# Patient Record
Sex: FEMALE | Race: BLACK OR AFRICAN AMERICAN | Employment: OTHER | URBAN - METROPOLITAN AREA
[De-identification: names, ages, dates, MRNs, and addresses within clinical notes are randomized per-mention and may not be internally consistent; named-entity substitution may affect disease eponyms.]

---

## 2019-04-25 ENCOUNTER — DOCUMENTATION ONLY (OUTPATIENT)
Dept: FAMILY MEDICINE | Facility: CLINIC | Age: 74
End: 2019-04-25

## 2019-04-25 ENCOUNTER — THERAPY VISIT (OUTPATIENT)
Dept: PHYSICAL THERAPY | Facility: CLINIC | Age: 74
End: 2019-04-25
Payer: COMMERCIAL

## 2019-04-25 DIAGNOSIS — M54.16 LUMBAR RADICULITIS: ICD-10-CM

## 2019-04-25 PROCEDURE — 97112 NEUROMUSCULAR REEDUCATION: CPT | Mod: GP | Performed by: PHYSICAL THERAPIST

## 2019-04-25 PROCEDURE — 97161 PT EVAL LOW COMPLEX 20 MIN: CPT | Mod: GP | Performed by: PHYSICAL THERAPIST

## 2019-04-25 PROCEDURE — 97110 THERAPEUTIC EXERCISES: CPT | Mod: GP | Performed by: PHYSICAL THERAPIST

## 2019-04-25 NOTE — LETTER
[facility]  [address]  [place] [address]  [phone]    2019    Re: Deisy Lara   :   1945  MRN:  [id number]   REFERRING PHYSICIAN:   Fer Coulter    [facility]    Date of Initial Evaluation: 2019  Visits:  Rxs Used: 1  Reason for Referral:  Lumbar radiculitis    EVALUATION SUMMARY    Subjective:  Deisy Lara is a 73 year old female with a lumbar condition. This is a chronic condition  19 saw MD for chronic LBP.  2010 insidious onset LBP that is worsening over time.  Has continued to have pain, even after 3 low back surgeries (including fusions, but doesn't know what levels or what other surgery had).  L4-L5 fusion from MD order.  Most recent fusion 2 years ago.    Patient reports pain:  Lumbar spine right and lumbar spine left (R>L).  Radiates to:  Gluteals left, gluteals right, lower leg right and knee right (anterior R thigh ).  Pain is described as aching (sore, hurting) and is intermittent and reported as 8/10.  Associated symptoms:  Tingling, loss of motion/stiffness, loss of balance and loss of strength (intermittent tingling R lateral hip, 3 falls 2018 while walking, giving out of R leg). Pain is worse in the A.M..  Symptoms are exacerbated by walking, standing and bending (walking 2-3 min then gets pain, getting up from sitting ) and relieved by rest (sitting).  Since onset symptoms are gradually worsening.    Previous treatment includes other.    General health as reported by patient is good.  Pertinent medical history includes:  Anemia, asthma, diabetes, high blood pressure and osteoarthritis (was using SEC for past 2 years but lost it last week.  Patient notes will sit for hours sewing without breaks and ankles swell).  Medical allergies: yes (amoxicilin, juneva).  Other surgeries include:  Orthopedic surgery  (back x 3).  Current medications:  High blood pressure medication.  Current occupation is Retired teacher.      Barriers include:  Other (stairs to basement).  Red flags:  Pain at night/rest.                 Objective:  Gait:  Ambulates with slow zoe and increased weight shifting side to side (suspect gluteal weakness)  Weight Bearing Status:  WBAT   Assistive Devices:  None    Lumbar/SI Evaluation  ROM:    AROM Lumbar:   Flexion:          No loss, reaches to toes with mild pain in LB  Ext:                    Max loss due to balance, back against plinth mod loss   Re: Deisy Lara   :   1945    Side Bend:        Left:     Right:   Rotation:           Left:     Right:   Side Glide:        Left:  Min loss painful    Right:  Min loss painful      Strength: MMT hip ABD B 4/5, hip ext 3+/5 B  Lumbar Myotomes:    T12-L3 (Hip Flex):  Left: 4+    Right: 4+  L2-4 (Quads):  Left:  5    Right:  4  L4 (Ankle DF):  Left:  5    Right:  5  L5 (Great Toe Ext): Left: 5    Right: 5   S1 (Toe Raise):  Right: 5  Lumbar Dermtomes:  normal  Neural Tension/Mobility:    Left side:SLR; SLR w/DF or Slump  negative.   Right side:   SLR w/DF positive.  Right side:   Slump or SLR  negative.   Lumbar Palpation:    Tenderness present at Left:    Piriformis and Gluteus Medius  Tenderness not present at Left:    Quadratus Lumborum; Erector Spinae; PSIS or Vertebral  Tenderness present at Right: Piriformis and Gluteus Medius  Tenderness not present at Right:  Quadratus Lumborum; Erector Spinae; PSIS or Vertebral    Bentley Lumbar Evaluation  Posture:  Sitting: poor  Correction of Posture: no effect  Other Observations: No pain in sitting, lumbar support (flat towel) feels good  Test Movements:  FIS: Pretest Movements: 5/10 LB and lateral R hip pain  EIS: During: no effect  After: no effect    Repeat EIS: During: decreases  After: better  Mechanical Response: IncROM  Static Tests:  Sitting Slouched: increases pain  Sitting Erect:  lessens pain  Conclusion: derangement (potential derangement asymmetrical below knee)  Principle of Treatment:  Posture Correction: Educate keep neutral spine, use of lumbar support (flat towel if more comfortable than roll), slouch over-correct, over-correct and back off 10% to nuetral unsupported sitting, frequent change of position, sit only for 60 min with sewing.  Discuss Sit <->sidelying<-> supine to lessen strain to LB.  Extension: trial EIS back leaning against counter for balance x 10 every 2-3 hours.  Prone lying prn for relief.  Other: core stabilization, balance work     Assessment/Plan:    Patient is a 73 year old female with lumbar complaints.    Patient has the following significant findings with corresponding treatment plan.                Diagnosis 1:  Lumbar radiculitis  Pain -  manual therapy, self management, education and home program  Decreased ROM/flexibility - manual therapy, therapeutic exercise and home program  Re: Deisy Fabian Jane   :   1945    Decreased strength - therapeutic exercise, therapeutic activities and home program  Impaired balance - neuro re-education, gait training, therapeutic activities, adaptive equipment/assistive device and home program  Decreased proprioception - neuro re-education, gait training, therapeutic activities and home program  Decreased function - therapeutic activities and home program  Impaired posture - neuro re-education and home program    Therapy Evaluation Codes:      Cumulative Therapy Evaluation is: Low complexity.    Previous and current functional limitations:  (See Goal Flow Sheet for this information)    Short term and Long term goals: (See Goal Flow Sheet for this information)     Communication ability:  Patient appears to be able to clearly communicate and understand verbal and written communication and follow directions correctly.  Treatment Explanation - The following has been discussed with the patient:   RX ordered/plan of  care  Anticipated outcomes  Possible risks and side effects  This patient would benefit from PT intervention to resume normal activities.   Rehab potential is good.    Frequency:  1 X week, once daily  Duration:  for 6 weeks  Discharge Plan:  Achieve all LTG.  Independent in home treatment program.  Reach maximal therapeutic benefit.    Thank you for your referral.    INQUIRIES  Therapist: Alicia Molina DPT  [facility] PHYSICAL THERAPY  [address] [address]  Phone: [phone]  Fax: [phone]

## 2019-04-25 NOTE — PROGRESS NOTES
[facility] Initial Evaluation  Subjective:    Deisy Lara is a 73 year old female with a lumbar condition.      This is a chronic condition  4/18/19 saw MD for chronic LBP.  2010 insidious onset LBP that is worsening over time.  Has continued to have pain, even after 3 low back surgeries (including fusions, but doesn't know what levels or what other surgery had).  L4-L5 fusion from MD order.  Most recent fusion 2 years ago.  .    Patient reports pain:  Lumbar spine right and lumbar spine left (R>L).  Radiates to:  Gluteals left, gluteals right, lower leg right and knee right (anterior R thigh ).  Pain is described as aching (sore, hurting) and is intermittent and reported as 8/10.  Associated symptoms:  Tingling, loss of motion/stiffness, loss of balance and loss of strength (intermittent tingling R lateral hip, 3 falls 12/2018 while walking, giving out of R leg). Pain is worse in the A.M..  Symptoms are exacerbated by walking, standing and bending (walking 2-3 min then gets pain, getting up from sitting ) and relieved by rest (sitting).  Since onset symptoms are gradually worsening.    Previous treatment includes other.    General health as reported by patient is good.  Pertinent medical history includes:  Anemia, asthma, diabetes, high blood pressure and osteoarthritis (was using SEC for past 2 years but lost it last week.  Patient notes will sit for hours sewing without breaks and ankles swell).  Medical allergies: yes (amoxicilin, juneva).  Other surgeries include:  Orthopedic surgery (back x 3).  Current medications:  High blood pressure medication.  Current occupation is Retired teacher  .        Barriers include:  Other (stairs to basement).    Red flags:  Pain at night/rest.                        Objective:    Gait:  Ambulates with slow zoe and increased weight shifting side to side (suspect gluteal weakness)  Weight Bearing Status:  WBAT   Assistive Devices:   None                 Lumbar/SI Evaluation  ROM:    AROM Lumbar:   Flexion:          No loss, reaches to toes with mild pain in LB  Ext:                    Max loss due to balance, back against plinth mod loss   Side Bend:        Left:     Right:   Rotation:           Left:     Right:   Side Glide:        Left:  Min loss painful    Right:  Min loss painful        Strength: MMT hip ABD B 4/5, hip ext 3+/5 B  Lumbar Myotomes:    T12-L3 (Hip Flex):  Left: 4+    Right: 4+  L2-4 (Quads):  Left:  5    Right:  4  L4 (Ankle DF):  Left:  5    Right:  5  L5 (Great Toe Ext): Left: 5    Right: 5   S1 (Toe Raise):  Right: 5      Lumbar Dermtomes:  normal                Neural Tension/Mobility:      Left side:SLR; SLR w/DF or Slump  negative.   Right side:   SLR w/DF positive.  Right side:   Slump or SLR  negative.   Lumbar Palpation:    Tenderness present at Left:    Piriformis and Gluteus Medius  Tenderness not present at Left:    Quadratus Lumborum; Erector Spinae; PSIS or Vertebral  Tenderness present at Right: Piriformis and Gluteus Medius  Tenderness not present at Right:  Quadratus Lumborum; Erector Spinae; PSIS or Vertebral                                                       Bentley Lumbar Evaluation    Posture:  Sitting: poor        Correction of Posture: no effect  Other Observations: No pain in sitting, lumbar support (flat towel) feels good    Test Movements:  FIS: Pretest Movements: 5/10 LB and lateral R hip pain    EIS: During: no effect  After: no effect    Repeat EIS: During: decreases  After: better  Mechanical Response: IncROM          Static Tests:  Sitting Slouched: increases pain  Sitting Erect: lessens pain          Conclusion: derangement (potential derangement asymmetrical below knee)  Principle of Treatment:  Posture Correction: Educate keep neutral spine, use of lumbar support (flat towel if more comfortable than roll), slouch over-correct, over-correct and back off 10% to nuetral unsupported sitting,  frequent change of position, sit only for 60 min with sewing.  Discuss Sit <->sidelying<-> supine to lessen strain to LB.    Extension: trial EIS back leaning against counter for balance x 10 every 2-3 hours.  Prone lying prn for relief.    Other: core stabilization, balance work                                       ROS    Assessment/Plan:    Patient is a 73 year old female with lumbar complaints.    Patient has the following significant findings with corresponding treatment plan.                Diagnosis 1:  Lumbar radiculitis    Pain -  manual therapy, self management, education and home program  Decreased ROM/flexibility - manual therapy, therapeutic exercise and home program  Decreased strength - therapeutic exercise, therapeutic activities and home program  Impaired balance - neuro re-education, gait training, therapeutic activities, adaptive equipment/assistive device and home program  Decreased proprioception - neuro re-education, gait training, therapeutic activities and home program  Decreased function - therapeutic activities and home program  Impaired posture - neuro re-education and home program    Therapy Evaluation Codes:      Cumulative Therapy Evaluation is: Low complexity.    Previous and current functional limitations:  (See Goal Flow Sheet for this information)    Short term and Long term goals: (See Goal Flow Sheet for this information)     Communication ability:  Patient appears to be able to clearly communicate and understand verbal and written communication and follow directions correctly.  Treatment Explanation - The following has been discussed with the patient:   RX ordered/plan of care  Anticipated outcomes  Possible risks and side effects  This patient would benefit from PT intervention to resume normal activities.   Rehab potential is good.    Frequency:  1 X week, once daily  Duration:  for 6 weeks  Discharge Plan:  Achieve all LTG.  Independent in home treatment program.  Reach  maximal therapeutic benefit.    Please refer to the daily flowsheet for treatment today, total treatment time and time spent performing 1:1 timed codes.

## 2019-04-25 NOTE — PROGRESS NOTES
"When opening a documentation only encounter, be sure to enter in \"Chief Complaint\" Forms and in \" Comments\" Title of form, description if needed.    Deisy is a 73 year old  female  Form received via: Fax  Form now resides in: Provider Ready    Katie Skinner CMA                Form has been completed by provider.     Form sent out via: Fax to [phone]  Patient informed: No, Reason:fax confirmed  NOT DRChloe LINK\"S PATIENT   Output date: April 25, 2019    Katie Skinner CMA      **Please close the encounter**      "

## 2019-05-20 ENCOUNTER — THERAPY VISIT (OUTPATIENT)
Dept: PHYSICAL THERAPY | Facility: CLINIC | Age: 74
End: 2019-05-20
Payer: COMMERCIAL

## 2019-05-20 DIAGNOSIS — M54.16 LUMBAR RADICULITIS: ICD-10-CM

## 2019-05-20 PROCEDURE — 97110 THERAPEUTIC EXERCISES: CPT | Mod: GP | Performed by: PHYSICAL THERAPY ASSISTANT

## 2019-05-20 PROCEDURE — 97112 NEUROMUSCULAR REEDUCATION: CPT | Mod: GP | Performed by: PHYSICAL THERAPY ASSISTANT

## 2019-05-23 ENCOUNTER — THERAPY VISIT (OUTPATIENT)
Dept: PHYSICAL THERAPY | Facility: CLINIC | Age: 74
End: 2019-05-23
Payer: COMMERCIAL

## 2019-05-23 DIAGNOSIS — M54.16 LUMBAR RADICULITIS: ICD-10-CM

## 2019-05-23 PROCEDURE — 97112 NEUROMUSCULAR REEDUCATION: CPT | Mod: GP | Performed by: PHYSICAL THERAPIST

## 2019-05-23 PROCEDURE — 97110 THERAPEUTIC EXERCISES: CPT | Mod: GP | Performed by: PHYSICAL THERAPIST

## 2019-05-28 ENCOUNTER — THERAPY VISIT (OUTPATIENT)
Dept: PHYSICAL THERAPY | Facility: CLINIC | Age: 74
End: 2019-05-28
Payer: COMMERCIAL

## 2019-05-28 DIAGNOSIS — M54.16 LUMBAR RADICULITIS: ICD-10-CM

## 2019-05-28 PROCEDURE — 97110 THERAPEUTIC EXERCISES: CPT | Mod: GP | Performed by: PHYSICAL THERAPIST

## 2019-05-28 PROCEDURE — 97140 MANUAL THERAPY 1/> REGIONS: CPT | Mod: GP | Performed by: PHYSICAL THERAPIST

## 2019-05-28 PROCEDURE — 97112 NEUROMUSCULAR REEDUCATION: CPT | Mod: GP | Performed by: PHYSICAL THERAPIST

## 2019-05-30 ENCOUNTER — THERAPY VISIT (OUTPATIENT)
Dept: PHYSICAL THERAPY | Facility: CLINIC | Age: 74
End: 2019-05-30
Payer: COMMERCIAL

## 2019-05-30 DIAGNOSIS — M54.16 LUMBAR RADICULITIS: ICD-10-CM

## 2019-05-30 PROCEDURE — 97110 THERAPEUTIC EXERCISES: CPT | Mod: GP | Performed by: PHYSICAL THERAPIST

## 2019-05-30 PROCEDURE — 97112 NEUROMUSCULAR REEDUCATION: CPT | Mod: GP | Performed by: PHYSICAL THERAPIST

## 2019-06-06 ENCOUNTER — THERAPY VISIT (OUTPATIENT)
Dept: PHYSICAL THERAPY | Facility: CLINIC | Age: 74
End: 2019-06-06
Payer: COMMERCIAL

## 2019-06-06 DIAGNOSIS — M54.16 LUMBAR RADICULITIS: ICD-10-CM

## 2019-06-06 PROCEDURE — 97112 NEUROMUSCULAR REEDUCATION: CPT | Mod: GP | Performed by: PHYSICAL THERAPIST

## 2019-06-06 PROCEDURE — 97110 THERAPEUTIC EXERCISES: CPT | Mod: GP | Performed by: PHYSICAL THERAPIST

## 2019-06-06 NOTE — LETTER
[facility] PHYSICAL THERAPY  [address]  [place] [address]  [phone]    2019    Re: Deisy Lara   :   1945  MRN:  [id number]   REFERRING PHYSICIAN:   Fer Coulter    [facility]    Date of Initial Evaluation:  2019  Visits:  Rxs Used: 6  Reason for Referral:  Lumbar radiculitis    PROGRESS  REPORT  Progress reporting period is from 19 to 19, 6 visits.       SUBJECTIVE  Subjective changes noted by patient: The last couple days has not had too much pain at all and could stand and walk upright, but awoke today with increased low back pain for unknown reasons.  Reports always leaves therapy feeling really good but when goes home and sits on couch for a long time then big increase in pain.  Saw MD last week and told either surgery or injection.  Saw surgeon yesterday and told can get injection in spine.  Patient would like to continue therapy while awaiting the injection.  Has been doing a lot at home, moving furniture for carpet shampooing etc.  Pain today R>L LB and lateral hip to thigh.     Current Pain level: 5/10.     Initial Pain level: 8/10.   Changes in function: Yes (See Goal flowsheet attached for changes in current functional level)  Adverse reaction to treatment or activity: None    OBJECTIVE  Changes noted in objective findings: Yes:  Continued poor slumped posture in clinic with hips slid forward in chair in flexed posture.   Suspect poor habitual C-T-L spine posture prevents patient from improving.  Consistently patient responds very well in clinic to lumbar extension principles, EIS and EIL with centralization of leg symptoms to low back and abolishment of symptoms by end of therapy session.  However, patient consistently returns to clinic with significant pain.    LROM flexion no loss, reaches to toes, ext mod loss.  EIS and EIL  decrease, better abolish symptoms    SLS 1-2 seconds B with hip drop.    Oswestry Low Back Disability Index improved from 33% to 30%.             Re: Deisy Lara   :   1945    ASSESSMENT/PLAN  Updated problem list and treatment plan:   Diagnosis 1: LBP  Pain - manual therapy, self management, education, directional preference exercise and home program  Decreased ROM/flexibility - manual therapy, therapeutic exercise and home program  Decreased strength - therapeutic exercise, therapeutic activities and home program  Decreased proprioception - neuro re-education, therapeutic activities and home program  Impaired gait - gait training, assistive devices and home program  Decreased function - therapeutic activities and home program  Impaired posture - neuro re-education and home program  STG/LTGs have been met or progress has been made towards goals: Yes (See Goal flow sheet completed today.)  Assessment of Progress: Patient responds very well in therapy, but unable to maintain improvements (always returns to therapy in significant pain).  Suspect lumbar steroid injection may be helpful.  Despite h/o lumbar fusion, patient responds very well to lumbar extension principles with consistent abolishment of symptoms in clinic.  Self Management Plans: Patient has been instructed in a home treatment program.  Patient has been instructed in self management of symptoms.  I have re-evaluated this patient and find that the nature, scope, duration and intensity of the therapy is appropriate for the medical condition of the patient.  Deisy continues to require the following intervention to meet STG and LTG's: PT    Recommendations:  This patient would benefit from continued therapy.     Frequency: 1 X week, once daily  Duration: for 6 weeks    Patient to follow up with MD for potential LIZABETH.        Thank you for your referral.    INQUIRIES  Therapist: Alicia Molina DPT   [place]  PHYSICAL THERAPY  [address] [address]  Phone: [phone]  Fax: [phone]

## 2019-06-06 NOTE — PROGRESS NOTES
Subjective:  HPI  Oswestry Score: 30 %                 Objective:  System    Physical Exam    General     ROS    Assessment/Plan:    PROGRESS  REPORT    Progress reporting period is from 4/25/19 to 6/6/19, 6 visits.       SUBJECTIVE  Subjective changes noted by patient:   The last couple days has not had too much pain at all and could stand and walk upright, but awoke today with increased low back pain for unknown reasons.  Reports always leaves therapy feeling really good but when goes home and sits on couch for a long time then big increase in pain.  Saw MD last week and told either surgery or injection.  Saw surgeon yesterday and told can get injection in spine.  Patient would like to continue therapy while awaiting the injection.  Has been doing a lot at home, moving furniture for carpet shampooing etc.  Pain today R>L LB and lateral hip to thigh     Current Pain level: 5/10.     Initial Pain level: 8/10.   Changes in function:  Yes (See Goal flowsheet attached for changes in current functional level)  Adverse reaction to treatment or activity: None    OBJECTIVE  Changes noted in objective findings:  Yes:   Continued poor slumped posture in clinic with hips slid forward in chair in flexed posture.    Suspect poor habitual C-T-L spine posture prevents patient from improving.  Consistently patient responds very well in clinic to lumbar extension principles, EIS and EIL with centralization of leg symptoms to low back and abolishment of symptoms by end of therapy session.  However, patient consistently returns to clinic with significant pain.      LROM flexion no loss, reaches to toes, ext mod loss.  EIS and EIL decrease, better abolish symptoms    SLS 1-2 seconds B with hip drop.    Oswestry Low Back Disability Index improved from 33% to 30%.     ASSESSMENT/PLAN  Updated problem list and treatment plan: Diagnosis 1:  LBP    Pain -  manual therapy, self management, education, directional preference exercise and home  program  Decreased ROM/flexibility - manual therapy, therapeutic exercise and home program  Decreased strength - therapeutic exercise, therapeutic activities and home program  Decreased proprioception - neuro re-education, therapeutic activities and home program  Impaired gait - gait training, assistive devices and home program  Decreased function - therapeutic activities and home program  Impaired posture - neuro re-education and home program  STG/LTGs have been met or progress has been made towards goals:  Yes (See Goal flow sheet completed today.)  Assessment of Progress: Patient responds very well in therapy, but unable to maintain improvements (always returns to therapy in significant pain).  Suspect lumbar steroid injection may be helpful.  Despite h/o lumbar fusion, patient responds very well to lumbar extension principles with consistent abolishment of symptoms in clinic.  Self Management Plans:  Patient has been instructed in a home treatment program.  Patient  has been instructed in self management of symptoms.  I have re-evaluated this patient and find that the nature, scope, duration and intensity of the therapy is appropriate for the medical condition of the patient.  Deisy continues to require the following intervention to meet STG and LTG's:  PT    Recommendations:  This patient would benefit from continued therapy.     Frequency:  1 X week, once daily  Duration:  for 6 weeks      Patient to follow up with MD for potential LIZABETH.    Please refer to the daily flowsheet for treatment today, total treatment time and time spent performing 1:1 timed codes.

## 2019-07-01 PROBLEM — M54.16 LUMBAR RADICULITIS: Status: RESOLVED | Noted: 2019-04-25 | Resolved: 2019-07-01

## 2019-07-01 NOTE — PROGRESS NOTES
Patient did not return for further treatment and no additional progress was noted.  Please refer to the progress note and goal flowsheet completed on 06/06/19 for discharge information.

## 2020-06-30 ENCOUNTER — THERAPY VISIT (OUTPATIENT)
Dept: PHYSICAL THERAPY | Facility: CLINIC | Age: 75
End: 2020-06-30
Payer: COMMERCIAL

## 2020-06-30 DIAGNOSIS — M48.02 CERVICAL STENOSIS OF SPINAL CANAL: Primary | ICD-10-CM

## 2020-06-30 PROCEDURE — 97530 THERAPEUTIC ACTIVITIES: CPT | Mod: GP | Performed by: PHYSICAL THERAPIST

## 2020-06-30 PROCEDURE — 97535 SELF CARE MNGMENT TRAINING: CPT | Mod: GP | Performed by: PHYSICAL THERAPIST

## 2020-06-30 PROCEDURE — 97162 PT EVAL MOD COMPLEX 30 MIN: CPT | Mod: GP | Performed by: PHYSICAL THERAPIST

## 2020-06-30 NOTE — LETTER
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 It appears that she does most of the chores at her home. She suffered a CVA in 2019. Was hospitalized for only two days. When having the stroke, she could not stand on her right leg. She now reports a sudden onset of 5/10 right arm pain with decreased right  strength. Diagnosed with cervical stenosis. We discussed her anatomy, diagnosis, and how to fall proof her home. She is a very motivated person, willing to start a HEP.  The history is provided by the patient. No  was used.   Therapist Generated HPI Evaluation  Affected Side: doesn't complain of neck pain.  This is a new condition.  Condition occurred with:  Insidious onset.  Site of Pain: only in the right arm.  Pain radiates to:  Lower arm right and hand right.   Associated symptoms:  Loss of strength and loss of motion/stiffness. Symptoms are exacerbated by lifting  and relieved by nothing.    Patient Health History  Deisy Lara being seen for right arm and hand pain.   Pain is reported as 5/10 on pain scale.  Pertinent medical history includes: asthma, diabetes and numbness/tingling. Other medical history details: CVA in .   Red flags:  None as reported by patient.  Medical allergies: none.   Current medications:  High blood pressure medication.    Current occupation is retired teacher.   Primary job tasks include:  Repetitive tasks.          Objective:  Standing  Alignment:    Cervical/Thoracic:  Forward head  Shoulder/UE:  Rounded shoulders      Re: Deisy Lara   :   1945    Cervical/Thoracic Evaluation  AROM:  AROM Cervical:  Flexion:          WNL  Extension:       20%  Rotation:         Left: 50%     Right: 60%  Side Bend:      Left:     Right:   Headaches cervical eval: intermittent.  Cervical Myotomes:  Cervical myotomes: appeared normal - mild decrease in  strength on the right.  Cervical Palpation:  : denies neck pain.                                     Musculoskeletal:        Arms:            Assessment/Plan:    Patient is a 74 year old female with cervical complaints.    Patient has the following significant findings with corresponding treatment plan.                Diagnosis 1:  Cervical stenosis  Pain -  self management, education and home program  Decreased strength - therapeutic exercise, therapeutic activities and home program  Impaired muscle performance - neuro re-education and home program  Decreased function - therapeutic activities and home program                        Re: Deisy Lara   :   1945    Therapy Evaluation Codes:   1) History comprised of:   Personal factors that impact the plan of care:      None.    Comorbidity factors that impact the plan of care are:      High blood pressure and Stroke.     Medications impacting care: High blood pressure.  2) Examination of Body Systems comprised of:   Body structures and functions that impact the plan of care:      Cervical spine.   Activity limitations that impact the plan of care are:      Grasping and Lifting.  3) Clinical presentation characteristics are:   Evolving/Changing.  4) Decision-Making    Moderate complexity using standardized patient assessment instrument   and/or measureable assessment of functional outcome.  Cumulative Therapy Evaluation is: Moderate complexity.    Previous and current functional limitations:  (See Goal Flow Sheet for this information)     Short term and Long term goals: (See Goal Flow Sheet for this information)   Communication ability:  Patient appears to be able to clearly communicate and understand verbal and written communication and follow directions correctly.  Treatment Explanation - The following has been discussed with the patient:   RX ordered/plan of care, Anticipated outcomes. Possible risks and side effects    This patient would benefit from PT intervention to resume normal activities.   Rehab potential is good.  Frequency:  1 X week, once daily  Duration:  for 8 weeks  Discharge Plan:  Achieve all LTG.  Independent in home treatment program.  Reach maximal therapeutic benefit.    Thank you for your referral.    INQUIRIES        Therapist:  Jaclyn Lincoln PT   [facility] [place] PHYSICAL THERAPY  [address] [address]  Phone: [phone]  Fax: [phone]

## 2020-07-01 NOTE — PROGRESS NOTES
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 She suffered a CVA in December 2019. Was hospitalized for only two days. When having the stroke, she could not stand on her right leg. She now reports a sudden onset of 5/10 right arm pain with decreased right  strength. Diagnosed with cervical stenosis. We discussed her anatomy, diagnosis, and how to fall proof her home. She is a very motivated person, willing to start a HEP.    The history is provided by the patient. No  was used.   Therapist Generated HPI Evaluation         Affected Side: doesn't complain of neck pain.    This is a new condition.  Condition occurred with:  Insidious onset.    Site of Pain: only in the right arm.    Pain radiates to:  Lower arm right and hand right.     Associated symptoms:  Loss of strength and loss of motion/stiffness. Symptoms are exacerbated by lifting  and relieved by nothing.          Patient Health History  Deisy Lara being seen for right arm and hand pain.          Pain is reported as 5/10 on pain scale.    Pertinent medical history includes: asthma, diabetes and numbness/tingling. Other medical history details: CVA in 12/19.   Red flags:  None as reported by patient.  Medical allergies: none.       Current medications:  High blood pressure medication.    Current occupation is retired teacher.   Primary job tasks include:  Repetitive tasks.                                    Objective:  Standing Alignment:    Cervical/Thoracic:  Forward head  Shoulder/UE:  Rounded shoulders                                  Cervical/Thoracic Evaluation    AROM:  AROM Cervical:    Flexion:          WNL  Extension:       20%  Rotation:         Left: 50%     Right: 60%  Side Bend:      Left:     Right:       Headaches cervical eval:  intermittent.  Cervical Myotomes:  Cervical myotomes: appeared normal - mild decrease in  strength on the right.                        Cervical Palpation:  : denies neck pain.                                                                                           Musculoskeletal:        Arms:        ROS    Assessment/Plan:    Patient is a 74 year old female with cervical complaints.    Patient has the following significant findings with corresponding treatment plan.                Diagnosis 1:  Cervical stenosis  Pain -  self management, education and home program  Decreased strength - therapeutic exercise, therapeutic activities and home program  Impaired muscle performance - neuro re-education and home program  Decreased function - therapeutic activities and home program    Therapy Evaluation Codes:   1) History comprised of:   Personal factors that impact the plan of care:      None.    Comorbidity factors that impact the plan of care are:      High blood pressure and Stroke.     Medications impacting care: High blood pressure.  2) Examination of Body Systems comprised of:   Body structures and functions that impact the plan of care:      Cervical spine.   Activity limitations that impact the plan of care are:      Grasping and Lifting.  3) Clinical presentation characteristics are:   Evolving/Changing.  4) Decision-Making    Moderate complexity using standardized patient assessment instrument and/or measureable assessment of functional outcome.  Cumulative Therapy Evaluation is: Moderate complexity.    Previous and current functional limitations:  (See Goal Flow Sheet for this information)    Short term and Long term goals: (See Goal Flow Sheet for this information)     Communication ability:  Patient appears to be able to clearly communicate and understand verbal and written communication and follow directions correctly.  Treatment Explanation - The following has been discussed with the patient:   RX  ordered/plan of care  Anticipated outcomes  Possible risks and side effects  This patient would benefit from PT intervention to resume normal activities.   Rehab potential is good.    Frequency:  1 X week, once daily  Duration:  for 8 weeks  Discharge Plan:  Achieve all LTG.  Independent in home treatment program.  Reach maximal therapeutic benefit.    Please refer to the daily flowsheet for treatment today, total treatment time and time spent performing 1:1 timed codes.

## 2020-07-08 ENCOUNTER — THERAPY VISIT (OUTPATIENT)
Dept: PHYSICAL THERAPY | Facility: CLINIC | Age: 75
End: 2020-07-08
Payer: COMMERCIAL

## 2020-07-08 DIAGNOSIS — M48.02 CERVICAL STENOSIS OF SPINAL CANAL: Primary | ICD-10-CM

## 2020-07-08 PROCEDURE — 97140 MANUAL THERAPY 1/> REGIONS: CPT | Mod: GP | Performed by: PHYSICAL THERAPIST

## 2020-07-08 PROCEDURE — 97110 THERAPEUTIC EXERCISES: CPT | Mod: GP | Performed by: PHYSICAL THERAPIST

## 2020-07-08 PROCEDURE — 97112 NEUROMUSCULAR REEDUCATION: CPT | Mod: GP | Performed by: PHYSICAL THERAPIST

## 2020-07-15 ENCOUNTER — THERAPY VISIT (OUTPATIENT)
Dept: PHYSICAL THERAPY | Facility: CLINIC | Age: 75
End: 2020-07-15
Payer: COMMERCIAL

## 2020-07-15 DIAGNOSIS — M48.02 CERVICAL STENOSIS OF SPINAL CANAL: ICD-10-CM

## 2020-07-15 PROCEDURE — 97140 MANUAL THERAPY 1/> REGIONS: CPT | Mod: GP | Performed by: PHYSICAL THERAPIST

## 2020-07-15 PROCEDURE — 97110 THERAPEUTIC EXERCISES: CPT | Mod: GP | Performed by: PHYSICAL THERAPIST

## 2020-07-15 PROCEDURE — 97112 NEUROMUSCULAR REEDUCATION: CPT | Mod: GP | Performed by: PHYSICAL THERAPIST

## 2020-07-22 ENCOUNTER — THERAPY VISIT (OUTPATIENT)
Dept: PHYSICAL THERAPY | Facility: CLINIC | Age: 75
End: 2020-07-22
Payer: COMMERCIAL

## 2020-07-22 DIAGNOSIS — M48.02 CERVICAL STENOSIS OF SPINAL CANAL: ICD-10-CM

## 2020-07-22 PROCEDURE — 97140 MANUAL THERAPY 1/> REGIONS: CPT | Mod: GP | Performed by: PHYSICAL THERAPIST

## 2020-07-22 PROCEDURE — 97112 NEUROMUSCULAR REEDUCATION: CPT | Mod: GP | Performed by: PHYSICAL THERAPIST

## 2020-07-22 PROCEDURE — 97110 THERAPEUTIC EXERCISES: CPT | Mod: GP | Performed by: PHYSICAL THERAPIST

## 2020-07-29 ENCOUNTER — THERAPY VISIT (OUTPATIENT)
Dept: PHYSICAL THERAPY | Facility: CLINIC | Age: 75
End: 2020-07-29
Payer: COMMERCIAL

## 2020-07-29 DIAGNOSIS — M48.02 CERVICAL STENOSIS OF SPINAL CANAL: ICD-10-CM

## 2020-07-29 PROCEDURE — 97110 THERAPEUTIC EXERCISES: CPT | Mod: GP | Performed by: PHYSICAL THERAPIST

## 2020-07-29 PROCEDURE — 97140 MANUAL THERAPY 1/> REGIONS: CPT | Mod: GP | Performed by: PHYSICAL THERAPIST

## 2020-07-29 PROCEDURE — 97112 NEUROMUSCULAR REEDUCATION: CPT | Mod: GP | Performed by: PHYSICAL THERAPIST

## 2020-08-05 ENCOUNTER — THERAPY VISIT (OUTPATIENT)
Dept: PHYSICAL THERAPY | Facility: CLINIC | Age: 75
End: 2020-08-05
Payer: COMMERCIAL

## 2020-08-05 DIAGNOSIS — M48.02 CERVICAL STENOSIS OF SPINAL CANAL: ICD-10-CM

## 2020-08-05 PROCEDURE — 97140 MANUAL THERAPY 1/> REGIONS: CPT | Mod: GP | Performed by: PHYSICAL THERAPIST

## 2020-08-05 PROCEDURE — 97110 THERAPEUTIC EXERCISES: CPT | Mod: GP | Performed by: PHYSICAL THERAPIST

## 2020-08-05 PROCEDURE — 97112 NEUROMUSCULAR REEDUCATION: CPT | Mod: GP | Performed by: PHYSICAL THERAPIST

## 2020-08-12 ENCOUNTER — THERAPY VISIT (OUTPATIENT)
Dept: PHYSICAL THERAPY | Facility: CLINIC | Age: 75
End: 2020-08-12
Payer: COMMERCIAL

## 2020-08-12 DIAGNOSIS — M48.02 CERVICAL STENOSIS OF SPINAL CANAL: ICD-10-CM

## 2020-08-12 PROCEDURE — 97112 NEUROMUSCULAR REEDUCATION: CPT | Mod: GP | Performed by: PHYSICAL THERAPIST

## 2020-08-12 PROCEDURE — 97140 MANUAL THERAPY 1/> REGIONS: CPT | Mod: GP | Performed by: PHYSICAL THERAPIST

## 2020-08-12 PROCEDURE — 97110 THERAPEUTIC EXERCISES: CPT | Mod: GP | Performed by: PHYSICAL THERAPIST

## 2020-08-19 ENCOUNTER — THERAPY VISIT (OUTPATIENT)
Dept: PHYSICAL THERAPY | Facility: CLINIC | Age: 75
End: 2020-08-19
Payer: COMMERCIAL

## 2020-08-19 DIAGNOSIS — M48.02 CERVICAL STENOSIS OF SPINAL CANAL: ICD-10-CM

## 2020-08-19 PROCEDURE — 97140 MANUAL THERAPY 1/> REGIONS: CPT | Mod: GP | Performed by: PHYSICAL THERAPIST

## 2020-08-19 PROCEDURE — 97110 THERAPEUTIC EXERCISES: CPT | Mod: GP | Performed by: PHYSICAL THERAPIST

## 2020-08-19 PROCEDURE — 97112 NEUROMUSCULAR REEDUCATION: CPT | Mod: GP | Performed by: PHYSICAL THERAPIST

## 2020-08-19 NOTE — LETTER
[facility] ATHLETIC [facility] PHYSICAL THERAPY  [address]  [facility] [address]  [phone]    2020    Re: Deisy Lara   :   1945  MRN:  [id number]   REFERRING PHYSICIAN:   Tessie Urrutia    [facility]  Date of Initial Evaluation:  20  Visits:  Rxs Used: 8  Reason for Referral:  Cervical stenosis of spinal canal    PROGRESS  REPORT  Progress reporting period is from 2020 to 2020.       SUBJECTIVE  Subjective changes noted by patient:  The neck does not have as frequent pain.  when the pain comes it is a 5/10.  last night it was hurting.  Stretching  the neck seems to help.  The arm is giving out less and I feel stronger in the arms.  I watching my posture.  Today I have a headaches in the front      Current Pain level: 2/10.      Initial Pain level: 5/10.   Changes in function:  Yes (See Goal flowsheet attached for changes in current functional level)  Adverse reaction to treatment or activity: activity - cleaning the house increase in soreness.     OBJECTIVE  Changes noted in objective findings:  Yes,  CROM flexion WFL, extension  moderate, rotation right moderate, left moderate, SB moderate, right , left minimal-moderate.  shoulder ROM generally WFL.  able to tolerate increase in  strength.  Balance single leg stance right 2-3 sec, left side balance needs hand hold assist.  Cueing for correct posture.       ASSESSMENT/PLAN  Updated problem list and treatment plan: Diagnosis 1:  cervical stenosis  Pain -  manual therapy, self management, education, directional preference exercise and home program  Decreased ROM/flexibility - manual therapy, therapeutic exercise, therapeutic activity and home program  Impaired balance - neuro re-education, therapeutic activities and home program  Impaired muscle performance - neuro re-education and home program  Decreased function -  therapeutic activities and home program  Impaired posture - neuro re-education, therapeutic activities and home program  STG/LTGs have been met or progress has been made towards goals:  Yes (See Goal flow sheet completed today.)  Assessment of Progress: The patient's condition is improving.  The patient's condition has potential to improve.  Self Management Plans:  Patient has been instructed in a home treatment program.  Re: Deisy Lara   :   1945    Patient  has been instructed in self management of symptoms.  I have re-evaluated this patient and find that the nature, scope, duration and intensity of the therapy is appropriate for the medical condition of the patient.  Deisy continues to require the following intervention to meet STG and LTG's:  PT    Recommendations:  This patient would benefit from continued therapy.     Frequency:  1 X week, once daily  Duration:  for 6 weeks    Patient continues to report improvement with right hand  and decrease in pain in the neck area.  Patient more aware of posture.  Patient to return to MD for further evaluation.  Patient would still benefit from further therapy.     Please refer to the daily flowsheet for treatment today, total treatment time and time spent performing 1:1 timed codes.    Thank you for your referral.    INQUIRIES  Therapist: Seda Cesar, PT  [facility] [place] PH[facility]ICAL THERAPY  [address] [address]  Phone: [phone]  Fax: [phone]

## 2020-08-19 NOTE — PROGRESS NOTES
Subjective:  HPI  Physical Exam                    Objective:  System    Physical Exam    General     ROS    Assessment/Plan:    PROGRESS  REPORT    Progress reporting period is from 7/1/2020 to 8/19/2020.       SUBJECTIVE  Subjective changes noted by patient:  The neck does not have as frequent pain.  when the pain comes it is a 5/10.  last night it was hurting.  Stretching  the neck seems to help.  The arm is giving out less and I feel stronger in the arms.  I watching my posture.  Today I have a headaches in the front      Current Pain level: 2/10.      Initial Pain level: 5/10.   Changes in function:  Yes (See Goal flowsheet attached for changes in current functional level)  Adverse reaction to treatment or activity: activity - cleaning the house increase in soreness.     OBJECTIVE  Changes noted in objective findings:  Yes,  CROM flexion WFL, extension  moderate, rotation right moderate, left moderate, SB moderate, right , left minimal-moderate.  shoulder ROM generally WFL.  able to tolerate increase in  strength.  Balance single leg stance right 2-3 sec, left side balance needs hand hold assist.  Cueing for correct posture.       ASSESSMENT/PLAN  Updated problem list and treatment plan: Diagnosis 1:  cervical stenosis  Pain -  manual therapy, self management, education, directional preference exercise and home program  Decreased ROM/flexibility - manual therapy, therapeutic exercise, therapeutic activity and home program  Impaired balance - neuro re-education, therapeutic activities and home program  Impaired muscle performance - neuro re-education and home program  Decreased function - therapeutic activities and home program  Impaired posture - neuro re-education, therapeutic activities and home program  STG/LTGs have been met or progress has been made towards goals:  Yes (See Goal flow sheet completed today.)  Assessment of Progress: The patient's condition is improving.  The patient's condition has  potential to improve.  Self Management Plans:  Patient has been instructed in a home treatment program.  Patient  has been instructed in self management of symptoms.  I have re-evaluated this patient and find that the nature, scope, duration and intensity of the therapy is appropriate for the medical condition of the patient.  Deisy continues to require the following intervention to meet STG and LTG's:  PT    Recommendations:  This patient would benefit from continued therapy.     Frequency:  1 X week, once daily  Duration:  for 6 weeks      Patient continues to report improvement with right hand  and decrease in pain in the neck area.  Patient more aware of posture.  Patient to return to MD for further evaluation.  Patient would still benefit from further therapy.     Please refer to the daily flowsheet for treatment today, total treatment time and time spent performing 1:1 timed codes.

## 2021-01-06 PROBLEM — M48.02 CERVICAL STENOSIS OF SPINAL CANAL: Status: RESOLVED | Noted: 2020-07-08 | Resolved: 2021-01-06

## 2021-01-06 NOTE — PROGRESS NOTES
Patient did not return for further treatment and no additional progress was noted.  Please refer to the progress note and goal flowsheet completed on 08/19/20 for discharge information.